# Patient Record
Sex: FEMALE | Race: WHITE | NOT HISPANIC OR LATINO
[De-identification: names, ages, dates, MRNs, and addresses within clinical notes are randomized per-mention and may not be internally consistent; named-entity substitution may affect disease eponyms.]

---

## 2024-08-30 PROBLEM — Z00.00 ENCOUNTER FOR PREVENTIVE HEALTH EXAMINATION: Status: ACTIVE | Noted: 2024-08-30

## 2024-09-02 ENCOUNTER — NON-APPOINTMENT (OUTPATIENT)
Age: 34
End: 2024-09-02

## 2024-09-03 ENCOUNTER — NON-APPOINTMENT (OUTPATIENT)
Age: 34
End: 2024-09-03

## 2024-09-03 ENCOUNTER — APPOINTMENT (OUTPATIENT)
Dept: OBGYN | Facility: CLINIC | Age: 34
End: 2024-09-03
Payer: COMMERCIAL

## 2024-09-03 DIAGNOSIS — Z34.90 ENCOUNTER FOR SUPERVISION OF NORMAL PREGNANCY, UNSPECIFIED, UNSPECIFIED TRIMESTER: ICD-10-CM

## 2024-09-03 DIAGNOSIS — Z80.0 FAMILY HISTORY OF MALIGNANT NEOPLASM OF DIGESTIVE ORGANS: ICD-10-CM

## 2024-09-03 PROCEDURE — 0500F INITIAL PRENATAL CARE VISIT: CPT

## 2024-09-04 LAB
BASOPHILS # BLD AUTO: 0.03 K/UL
BASOPHILS NFR BLD AUTO: 0.4 %
EOSINOPHIL # BLD AUTO: 0.05 K/UL
EOSINOPHIL NFR BLD AUTO: 0.6 %
HCT VFR BLD CALC: 35.7 %
HGB BLD-MCNC: 11.8 G/DL
IMM GRANULOCYTES NFR BLD AUTO: 0.7 %
LYMPHOCYTES # BLD AUTO: 1.36 K/UL
LYMPHOCYTES NFR BLD AUTO: 16.9 %
MAN DIFF?: NORMAL
MCHC RBC-ENTMCNC: 31.8 PG
MCHC RBC-ENTMCNC: 33.1 GM/DL
MCV RBC AUTO: 96.2 FL
MONOCYTES # BLD AUTO: 0.55 K/UL
MONOCYTES NFR BLD AUTO: 6.8 %
NEUTROPHILS # BLD AUTO: 6 K/UL
NEUTROPHILS NFR BLD AUTO: 74.6 %
PLATELET # BLD AUTO: 149 K/UL
RBC # BLD: 3.71 M/UL
RBC # FLD: 13.2 %
WBC # FLD AUTO: 8.05 K/UL

## 2024-09-05 LAB
ABO + RH PNL BLD: NORMAL
B19V IGG SER QL IA: 0.35 INDEX
B19V IGG+IGM SER-IMP: NEGATIVE
B19V IGG+IGM SER-IMP: NORMAL
B19V IGM FLD-ACNC: 0.35 INDEX
B19V IGM SER-ACNC: NEGATIVE
BACTERIA UR CULT: NORMAL
BLD GP AB SCN SERPL QL: NORMAL
C TRACH RRNA SPEC QL NAA+PROBE: NOT DETECTED
CMV IGG SERPL QL: <0.2 U/ML
CMV IGG SERPL-IMP: NEGATIVE
CMV IGM SERPL QL: <8 AU/ML
CMV IGM SERPL QL: NEGATIVE
ESTIMATED AVERAGE GLUCOSE: 82 MG/DL
HBA1C MFR BLD HPLC: 4.5 %
HBV SURFACE AG SER QL: NONREACTIVE
HCV AB SER QL: NONREACTIVE
HCV S/CO RATIO: 0.1 S/CO
HGB A MFR BLD: 97.2 %
HGB A2 MFR BLD: 2.8 %
HGB FRACT BLD-IMP: NORMAL
HIV1+2 AB SPEC QL IA.RAPID: NONREACTIVE
HSV 1+2 IGG SER IA-IMP: NEGATIVE
HSV 1+2 IGG SER IA-IMP: NEGATIVE
HSV1 IGG SER QL: 0.26 INDEX
HSV2 IGG SER QL: 0.06 INDEX
MEV IGG FLD QL IA: <5 AU/ML
MEV IGG+IGM SER-IMP: NEGATIVE
MEV IGM SER QL: 0.17 AU
N GONORRHOEA RRNA SPEC QL NAA+PROBE: NOT DETECTED
RUBV IGG FLD-ACNC: 8.39 INDEX
RUBV IGG SER-IMP: POSITIVE
RUBV IGM FLD-ACNC: <20 AU/ML
SOURCE AMPLIFICATION: NORMAL
T GONDII AB SER-IMP: NEGATIVE
T GONDII AB SER-IMP: NEGATIVE
T GONDII IGG SER QL: <3 IU/ML
T GONDII IGM SER QL: <3 AU/ML
T PALLIDUM AB SER QL IA: NEGATIVE
TSH SERPL-ACNC: 1.38 UIU/ML
VZV AB TITR SER: POSITIVE
VZV IGG SER IF-ACNC: 1209 INDEX
VZV IGM SER IF-ACNC: <0.91 INDEX

## 2024-09-13 ENCOUNTER — APPOINTMENT (OUTPATIENT)
Dept: ANTEPARTUM | Facility: CLINIC | Age: 34
End: 2024-09-13
Payer: COMMERCIAL

## 2024-09-13 ENCOUNTER — ASOB RESULT (OUTPATIENT)
Age: 34
End: 2024-09-13

## 2024-09-13 PROCEDURE — 76816 OB US FOLLOW-UP PER FETUS: CPT

## 2024-09-13 PROCEDURE — 76820 UMBILICAL ARTERY ECHO: CPT | Mod: 59

## 2024-09-13 PROCEDURE — 76819 FETAL BIOPHYS PROFIL W/O NST: CPT | Mod: 59

## 2024-09-18 ENCOUNTER — APPOINTMENT (OUTPATIENT)
Dept: OBGYN | Facility: CLINIC | Age: 34
End: 2024-09-18

## 2024-09-18 DIAGNOSIS — Z23 ENCOUNTER FOR IMMUNIZATION: ICD-10-CM

## 2024-09-18 PROCEDURE — 90471 IMMUNIZATION ADMIN: CPT

## 2024-09-18 PROCEDURE — 0502F SUBSEQUENT PRENATAL CARE: CPT

## 2024-09-18 PROCEDURE — 90686 IIV4 VACC NO PRSV 0.5 ML IM: CPT

## 2024-09-18 PROCEDURE — 90715 TDAP VACCINE 7 YRS/> IM: CPT

## 2024-09-18 PROCEDURE — 90472 IMMUNIZATION ADMIN EACH ADD: CPT

## 2024-09-30 ENCOUNTER — NON-APPOINTMENT (OUTPATIENT)
Age: 34
End: 2024-09-30

## 2024-09-30 ENCOUNTER — APPOINTMENT (OUTPATIENT)
Dept: OBGYN | Facility: CLINIC | Age: 34
End: 2024-09-30
Payer: COMMERCIAL

## 2024-09-30 PROCEDURE — 0500F INITIAL PRENATAL CARE VISIT: CPT

## 2024-10-02 ENCOUNTER — NON-APPOINTMENT (OUTPATIENT)
Age: 34
End: 2024-10-02

## 2024-10-14 ENCOUNTER — NON-APPOINTMENT (OUTPATIENT)
Age: 34
End: 2024-10-14

## 2024-10-14 ENCOUNTER — APPOINTMENT (OUTPATIENT)
Dept: OBGYN | Facility: CLINIC | Age: 34
End: 2024-10-14
Payer: COMMERCIAL

## 2024-10-14 DIAGNOSIS — Z23 ENCOUNTER FOR IMMUNIZATION: ICD-10-CM

## 2024-10-14 PROCEDURE — 0502F SUBSEQUENT PRENATAL CARE: CPT

## 2024-10-15 PROBLEM — Z23 ENCOUNTER FOR IMMUNIZATION: Status: ACTIVE | Noted: 2024-09-18 | Resolved: 2024-10-29

## 2024-10-16 LAB
BASOPHILS # BLD AUTO: 0.04 K/UL
BASOPHILS NFR BLD AUTO: 0.5 %
EOSINOPHIL # BLD AUTO: 0.05 K/UL
EOSINOPHIL NFR BLD AUTO: 0.7 %
HCT VFR BLD CALC: 34.1 %
HGB BLD-MCNC: 11.2 G/DL
IMM GRANULOCYTES NFR BLD AUTO: 1.2 %
LYMPHOCYTES # BLD AUTO: 1.09 K/UL
LYMPHOCYTES NFR BLD AUTO: 14.3 %
MAN DIFF?: NORMAL
MCHC RBC-ENTMCNC: 32 PG
MCHC RBC-ENTMCNC: 32.8 GM/DL
MCV RBC AUTO: 97.4 FL
MONOCYTES # BLD AUTO: 0.56 K/UL
MONOCYTES NFR BLD AUTO: 7.3 %
NEUTROPHILS # BLD AUTO: 5.79 K/UL
NEUTROPHILS NFR BLD AUTO: 76 %
PLATELET # BLD AUTO: 121 K/UL
RBC # BLD: 3.5 M/UL
RBC # FLD: 12.7 %
WBC # FLD AUTO: 7.62 K/UL

## 2024-10-17 LAB — B-HEM STREP SPEC QL CULT: NORMAL

## 2024-10-18 ENCOUNTER — ASOB RESULT (OUTPATIENT)
Age: 34
End: 2024-10-18

## 2024-10-18 ENCOUNTER — APPOINTMENT (OUTPATIENT)
Dept: ANTEPARTUM | Facility: CLINIC | Age: 34
End: 2024-10-18
Payer: COMMERCIAL

## 2024-10-18 PROCEDURE — 76819 FETAL BIOPHYS PROFIL W/O NST: CPT | Mod: 59

## 2024-10-18 PROCEDURE — 76816 OB US FOLLOW-UP PER FETUS: CPT

## 2024-10-18 PROCEDURE — 76820 UMBILICAL ARTERY ECHO: CPT | Mod: 59

## 2024-10-24 ENCOUNTER — APPOINTMENT (OUTPATIENT)
Dept: OBGYN | Facility: CLINIC | Age: 34
End: 2024-10-24
Payer: COMMERCIAL

## 2024-10-24 LAB
BILEACIDS T: 29 UMOL/L
CHENDEOXYCHOLIC ACID: 8.6 UMOL/L
CHOLIC ACID: 15 UMOL/L
DEOXYCHOLIC ACID: 4.9 UMOL/L
URSODEOXYCH: <0.1 UMOL/L

## 2024-10-24 PROCEDURE — 0502F SUBSEQUENT PRENATAL CARE: CPT

## 2024-10-25 ENCOUNTER — INPATIENT (INPATIENT)
Facility: HOSPITAL | Age: 34
LOS: 2 days | Discharge: HOME | End: 2024-10-28
Attending: OBSTETRICS & GYNECOLOGY | Admitting: OBSTETRICS & GYNECOLOGY
Payer: COMMERCIAL

## 2024-10-25 VITALS — TEMPERATURE: 98 F

## 2024-10-25 LAB
ALBUMIN SERPL ELPH-MCNC: 3.5 G/DL — SIGNIFICANT CHANGE UP (ref 3.3–5)
ALP SERPL-CCNC: 206 U/L — HIGH (ref 40–120)
ALT FLD-CCNC: 43 U/L — SIGNIFICANT CHANGE UP (ref 10–45)
ANION GAP SERPL CALC-SCNC: 12 MMOL/L — SIGNIFICANT CHANGE UP (ref 5–17)
AST SERPL-CCNC: 41 U/L — HIGH (ref 10–40)
BASOPHILS # BLD AUTO: 0.04 K/UL — SIGNIFICANT CHANGE UP (ref 0–0.2)
BASOPHILS NFR BLD AUTO: 0.6 % — SIGNIFICANT CHANGE UP (ref 0–2)
BILIRUB SERPL-MCNC: 0.9 MG/DL — SIGNIFICANT CHANGE UP (ref 0.2–1.2)
BLD GP AB SCN SERPL QL: NEGATIVE — SIGNIFICANT CHANGE UP
BUN SERPL-MCNC: 12 MG/DL — SIGNIFICANT CHANGE UP (ref 7–23)
CALCIUM SERPL-MCNC: 9 MG/DL — SIGNIFICANT CHANGE UP (ref 8.4–10.5)
CHLORIDE SERPL-SCNC: 106 MMOL/L — SIGNIFICANT CHANGE UP (ref 96–108)
CO2 SERPL-SCNC: 20 MMOL/L — LOW (ref 22–31)
CREAT ?TM UR-MCNC: 62 MG/DL — SIGNIFICANT CHANGE UP
CREAT SERPL-MCNC: 0.66 MG/DL — SIGNIFICANT CHANGE UP (ref 0.5–1.3)
EGFR: 118 ML/MIN/1.73M2 — SIGNIFICANT CHANGE UP
EOSINOPHIL # BLD AUTO: 0.04 K/UL — SIGNIFICANT CHANGE UP (ref 0–0.5)
EOSINOPHIL NFR BLD AUTO: 0.6 % — SIGNIFICANT CHANGE UP (ref 0–6)
FIBRINOGEN PPP-MCNC: 536 MG/DL — HIGH (ref 200–445)
GLUCOSE SERPL-MCNC: 91 MG/DL — SIGNIFICANT CHANGE UP (ref 70–99)
HCT VFR BLD CALC: 35.4 % — SIGNIFICANT CHANGE UP (ref 34.5–45)
HGB BLD-MCNC: 11.7 G/DL — SIGNIFICANT CHANGE UP (ref 11.5–15.5)
IMM GRANULOCYTES NFR BLD AUTO: 1.3 % — HIGH (ref 0–0.9)
LDH SERPL L TO P-CCNC: 220 U/L — SIGNIFICANT CHANGE UP (ref 50–242)
LYMPHOCYTES # BLD AUTO: 1.52 K/UL — SIGNIFICANT CHANGE UP (ref 1–3.3)
LYMPHOCYTES # BLD AUTO: 21.4 % — SIGNIFICANT CHANGE UP (ref 13–44)
MCHC RBC-ENTMCNC: 30.9 PG — SIGNIFICANT CHANGE UP (ref 27–34)
MCHC RBC-ENTMCNC: 33.1 GM/DL — SIGNIFICANT CHANGE UP (ref 32–36)
MCV RBC AUTO: 93.4 FL — SIGNIFICANT CHANGE UP (ref 80–100)
MONOCYTES # BLD AUTO: 0.55 K/UL — SIGNIFICANT CHANGE UP (ref 0–0.9)
MONOCYTES NFR BLD AUTO: 7.8 % — SIGNIFICANT CHANGE UP (ref 2–14)
NEUTROPHILS # BLD AUTO: 4.85 K/UL — SIGNIFICANT CHANGE UP (ref 1.8–7.4)
NEUTROPHILS NFR BLD AUTO: 68.3 % — SIGNIFICANT CHANGE UP (ref 43–77)
NRBC # BLD: 0 /100 WBCS — SIGNIFICANT CHANGE UP (ref 0–0)
PLATELET # BLD AUTO: 135 K/UL — LOW (ref 150–400)
POTASSIUM SERPL-MCNC: 3.8 MMOL/L — SIGNIFICANT CHANGE UP (ref 3.5–5.3)
POTASSIUM SERPL-SCNC: 3.8 MMOL/L — SIGNIFICANT CHANGE UP (ref 3.5–5.3)
PROT ?TM UR-MCNC: 9 MG/DL — SIGNIFICANT CHANGE UP (ref 0–12)
PROT SERPL-MCNC: 7.1 G/DL — SIGNIFICANT CHANGE UP (ref 6–8.3)
PROT/CREAT UR-RTO: 0.1 RATIO — SIGNIFICANT CHANGE UP (ref 0–0.2)
RBC # BLD: 3.79 M/UL — LOW (ref 3.8–5.2)
RBC # FLD: 12.5 % — SIGNIFICANT CHANGE UP (ref 10.3–14.5)
RH IG SCN BLD-IMP: POSITIVE — SIGNIFICANT CHANGE UP
RH IG SCN BLD-IMP: POSITIVE — SIGNIFICANT CHANGE UP
SODIUM SERPL-SCNC: 138 MMOL/L — SIGNIFICANT CHANGE UP (ref 135–145)
URATE SERPL-MCNC: 6.6 MG/DL — SIGNIFICANT CHANGE UP (ref 2.5–7)
WBC # BLD: 7.09 K/UL — SIGNIFICANT CHANGE UP (ref 3.8–10.5)
WBC # FLD AUTO: 7.09 K/UL — SIGNIFICANT CHANGE UP (ref 3.8–10.5)

## 2024-10-25 RX ORDER — FAMOTIDINE 10 MG/ML
20 INJECTION INTRAVENOUS ONCE
Refills: 0 | Status: DISCONTINUED | OUTPATIENT
Start: 2024-10-25 | End: 2024-10-25

## 2024-10-25 RX ORDER — OXYTOCIN IN D5W-0.2% SODIUM CL 15/250 ML
167 PLASTIC BAG, INJECTION (ML) INTRAVENOUS
Qty: 30 | Refills: 0 | Status: DISCONTINUED | OUTPATIENT
Start: 2024-10-25 | End: 2024-10-26

## 2024-10-25 RX ORDER — CHLORHEXIDINE GLUCONATE 40 MG/ML
1 SOLUTION TOPICAL DAILY
Refills: 0 | Status: DISCONTINUED | OUTPATIENT
Start: 2024-10-25 | End: 2024-10-28

## 2024-10-25 RX ORDER — CITRIC ACID/SODIUM CITRATE 334-500MG
30 SOLUTION, ORAL ORAL ONCE
Refills: 0 | Status: DISCONTINUED | OUTPATIENT
Start: 2024-10-25 | End: 2024-10-25

## 2024-10-25 RX ORDER — OXYTOCIN IN D5W-0.2% SODIUM CL 15/250 ML
PLASTIC BAG, INJECTION (ML) INTRAVENOUS
Qty: 30 | Refills: 0 | Status: DISCONTINUED | OUTPATIENT
Start: 2024-10-25 | End: 2024-10-26

## 2024-10-25 RX ORDER — CITRIC ACID/SODIUM CITRATE 334-500MG
15 SOLUTION, ORAL ORAL EVERY 6 HOURS
Refills: 0 | Status: DISCONTINUED | OUTPATIENT
Start: 2024-10-25 | End: 2024-10-26

## 2024-10-25 RX ORDER — CEFAZOLIN SODIUM 1 G
2000 VIAL (EA) INJECTION ONCE
Refills: 0 | Status: DISCONTINUED | OUTPATIENT
Start: 2024-10-25 | End: 2024-10-25

## 2024-10-25 RX ORDER — CHLORHEXIDINE GLUCONATE 40 MG/ML
1 SOLUTION TOPICAL DAILY
Refills: 0 | Status: DISCONTINUED | OUTPATIENT
Start: 2024-10-25 | End: 2024-10-26

## 2024-10-25 RX ADMIN — Medication 125 MILLILITER(S): at 07:21

## 2024-10-25 RX ADMIN — Medication 2 MILLIUNIT(S)/MIN: at 09:21

## 2024-10-25 RX ADMIN — Medication 125 MILLILITER(S): at 21:55

## 2024-10-25 NOTE — OB PROVIDER H&P - ASSESSMENT
A/P: 34y  at 38w1d presenting for IOL for ICP. BPs normotensive.   - Admit to L&D  - NPO and IVF  - Routine labs. Prenatal labs reviewed, as above.   - Full labs  - GBS negative, no abx indicated  - Fetal status reassuring, continuous efm and toco.   - Plan for IOL with cook balloon and pitocin    D/w Dr. Alcala

## 2024-10-25 NOTE — OB RN PATIENT PROFILE - BREASTFEEDING PROVIDES STABLE TEMPERATURE THROUGH SKIN TO SKIN CONTACT

## 2024-10-25 NOTE — OB RN PATIENT PROFILE - FUNCTIONAL ASSESSMENT - BASIC MOBILITY 3.
Please find out if patient is burning with urination or if it is like sitting on a hot golf ball type burning   4 = No assist / stand by assistance

## 2024-10-25 NOTE — OB RN PATIENT PROFILE - NSWEIGHTCALCTOOLDRUG_GEN_A_CORE
Wound care instructions provided.  Observe for any signs of infection or other problems.  Return for suture removal in 10 days.  
 used

## 2024-10-25 NOTE — OB PROVIDER IHI INDUCTION/AUGMENTATION NOTE - LABOR: CERVICAL DILATION
Progress Note( Dr. Sanchez)  2024  Subjective:   Admit Date: 2024  PCP: No primary care provider on file.    Admitted For :  Delivering the baby had  on 2024     Consulted For: Better control of blood glucose     Interval History: Patient is doing a lot better this morning did not require any insulin yesterday blood glucose has been in the 170+ some time    Patient is anemic the most recent hemoglobin hemoglobin was 9.6    Denies any chest pains,   Denies SOB .   Denies nausea or vomiting.   No new bowel or bladder symptoms.     No intake or output data in the 24 hours ending 24 2131    DATA    CBC:   Recent Labs     24  0600   WBC 9.4   HGB 9.6*         CMP:No results for input(s): \"NA\", \"K\", \"CL\", \"CO2\", \"BUN\", \"CREATININE\", \"GLU\", \"CALCIUM\", \"PROT\", \"LABALBU\", \"BILITOT\", \"ALKPHOS\", \"AST\", \"ALT\" in the last 72 hours.  Lipids: No results found for: \"CHOL\", \"HDL\", \"TRIG\"  Glucose:  Recent Labs     24  2236 24  0845 24  1248   POCGLU 155* 103* 90       MazarsomjgH1W:No results found for: \"LABA1C\"  High Sensitivity TSH: No results found for: \"TSHHS\"  Free T3: No results found for: \"FT3\"  Free T4:No results found for: \"T4FREE\"    No orders to display        Scheduled Medicines   Medications:        Infusions:           Objective:   Vitals: /86   Pulse 86   Temp 98.1 °F (36.7 °C) (Oral)   Resp 18   Ht 1.59 m (5' 2.6\")   Wt 102.1 kg (225 lb)   LMP 2023 (Exact Date)   SpO2 99%   Breastfeeding Unknown   BMI 40.37 kg/m²   General appearance: alert and cooperative with exam  Neck: no JVD or bruit  Thyroid : Normal lobes   Lungs: Has Vesicular Breath sounds   Heart:  regular rate and rhythm  Abdomen: soft, non-tender; bowel sounds normal; no masses,  no organomegaly  Musculoskeletal: Normal  Extremities: extremities normal, , no edema  Neurologic:  Awake, alert, oriented to name, place and time.  Cranial nerves II-XII are grossly intact.  Motor  1-1.9 cm

## 2024-10-25 NOTE — OB RN PATIENT PROFILE - FUNCTIONAL ASSESSMENT - BASIC MOBILITY 1.
[Joint Pain] : joint pain [Joint Stiffness] : joint stiffness [Muscle Weakness] : muscle weakness [Back Pain] : back pain [Negative] : Gastrointestinal 4 = No assist / stand by assistance

## 2024-10-25 NOTE — OB RN PATIENT PROFILE - FALL HARM RISK - UNIVERSAL INTERVENTIONS
Bed in lowest position, wheels locked, appropriate side rails in place/Call bell, personal items and telephone in reach/Instruct patient to call for assistance before getting out of bed or chair/Non-slip footwear when patient is out of bed/[place] to call system/Physically safe environment - no spills, clutter or unnecessary equipment/Purposeful Proactive Rounding/Room/bathroom lighting operational, light cord in reach Quality 131: Pain Assessment And Follow-Up: Pain assessment using a standardized tool is documented as negative, no follow-up plan required Quality 110: Preventive Care And Screening: Influenza Immunization: Influenza Immunization Ordered or Recommended, but not Administered due to system reason Detail Level: Zone

## 2024-10-25 NOTE — OB PROVIDER H&P - HISTORY OF PRESENT ILLNESS
FLORI [id number]  S: 34y  at 38w1d presenting for IOL for ICP. Pt reports itching began 2 weeks ago, with BA 29 on 10/23 Pt reports +FM and mild cramping, denies contractions, LOF, ROM, and vaginal bleeding. She denies HA, blurry vision, scotoma, RUQ/epigastric pain.    Antepartum  Spontaneous, NIPT wnl, anatomy scan wnl, passed GCT, GBS negative, EFW 3400g  ICP: BA 10/23, itching 2 weeks ago    Ob: : current  GYN: denies hx of abnormal paps, ovarian cysts, uterine fibroids, STIs  PMHx: recurrent UTIs  Surg: denies  Meds: PNV  No Known Allergies      PE  T(C): 36.9 (10-25-24 @ 07:03), Max: 36.9 (10-25-24 @ 06:40)  HR: 67 (10-25-24 @ 07:03) (67 - 67)  BP: 130/84 (10-25-24 @ 07:03) (130/84 - 130/84)  RR: 15 (10-25-24 @ 07:03) (15 - 15)  SpO2: 100% (10-25-24 @ 07:03) (100% - 100%)  General: NAD  Abdomen: Soft; Nontender; Gravid  SVE: /-3    NST: baseline , mod variability, +accels, -decels, cat I  [place]: contractions q5-6min  TAUS: cephalic presentation

## 2024-10-26 LAB — T PALLIDUM AB TITR SER: NEGATIVE — SIGNIFICANT CHANGE UP

## 2024-10-26 PROCEDURE — 88307 TISSUE EXAM BY PATHOLOGIST: CPT | Mod: 26

## 2024-10-26 PROCEDURE — 59400 OBSTETRICAL CARE: CPT

## 2024-10-26 RX ORDER — OXYCODONE HYDROCHLORIDE 30 MG/1
5 TABLET ORAL
Refills: 0 | Status: DISCONTINUED | OUTPATIENT
Start: 2024-10-26 | End: 2024-10-28

## 2024-10-26 RX ORDER — DIBUCAINE 1 %
1 OINTMENT (GRAM) TOPICAL EVERY 6 HOURS
Refills: 0 | Status: DISCONTINUED | OUTPATIENT
Start: 2024-10-26 | End: 2024-10-28

## 2024-10-26 RX ORDER — HYDROCORTISONE 1 %
1 OINTMENT (GRAM) TOPICAL EVERY 6 HOURS
Refills: 0 | Status: DISCONTINUED | OUTPATIENT
Start: 2024-10-26 | End: 2024-10-28

## 2024-10-26 RX ORDER — IBUPROFEN 200 MG
600 TABLET ORAL EVERY 6 HOURS
Refills: 0 | Status: COMPLETED | OUTPATIENT
Start: 2024-10-26 | End: 2025-09-24

## 2024-10-26 RX ORDER — MAGNESIUM HYDROXIDE 1200 MG/15ML
30 SUSPENSION ORAL
Refills: 0 | Status: DISCONTINUED | OUTPATIENT
Start: 2024-10-26 | End: 2024-10-28

## 2024-10-26 RX ORDER — IBUPROFEN 200 MG
600 TABLET ORAL EVERY 6 HOURS
Refills: 0 | Status: DISCONTINUED | OUTPATIENT
Start: 2024-10-26 | End: 2024-10-28

## 2024-10-26 RX ORDER — SIMETHICONE 80 MG/1
80 TABLET, CHEWABLE ORAL EVERY 4 HOURS
Refills: 0 | Status: DISCONTINUED | OUTPATIENT
Start: 2024-10-26 | End: 2024-10-28

## 2024-10-26 RX ORDER — FENTANYL/BUPIVACAINE/NS/PF 2-1250MCG
250 SYRINGE (ML) EPIDURAL
Refills: 0 | Status: DISCONTINUED | OUTPATIENT
Start: 2024-10-26 | End: 2024-10-26

## 2024-10-26 RX ORDER — BENZOCAINE 200 MG/G
1 GEL ORAL EVERY 6 HOURS
Refills: 0 | Status: DISCONTINUED | OUTPATIENT
Start: 2024-10-26 | End: 2024-10-28

## 2024-10-26 RX ORDER — OXYTOCIN IN D5W-0.2% SODIUM CL 15/250 ML
167 PLASTIC BAG, INJECTION (ML) INTRAVENOUS
Qty: 30 | Refills: 0 | Status: DISCONTINUED | OUTPATIENT
Start: 2024-10-26 | End: 2024-10-28

## 2024-10-26 RX ORDER — DIPHENHYDRAMINE HCL 12.5MG/5ML
25 ELIXIR ORAL EVERY 6 HOURS
Refills: 0 | Status: DISCONTINUED | OUTPATIENT
Start: 2024-10-26 | End: 2024-10-28

## 2024-10-26 RX ORDER — SODIUM CHLORIDE 9 MG/ML
3 INJECTION, SOLUTION INTRAMUSCULAR; INTRAVENOUS; SUBCUTANEOUS EVERY 8 HOURS
Refills: 0 | Status: DISCONTINUED | OUTPATIENT
Start: 2024-10-26 | End: 2024-10-28

## 2024-10-26 RX ORDER — PRENATAL VIT/IRON FUM/FOLIC AC 60 MG-1 MG
1 TABLET ORAL DAILY
Refills: 0 | Status: DISCONTINUED | OUTPATIENT
Start: 2024-10-26 | End: 2024-10-28

## 2024-10-26 RX ORDER — PRAMOXINE HCL 1 %
1 CREAM (GRAM) RECTAL EVERY 4 HOURS
Refills: 0 | Status: DISCONTINUED | OUTPATIENT
Start: 2024-10-26 | End: 2024-10-28

## 2024-10-26 RX ORDER — ACETAMINOPHEN 500 MG
975 TABLET ORAL
Refills: 0 | Status: DISCONTINUED | OUTPATIENT
Start: 2024-10-26 | End: 2024-10-28

## 2024-10-26 RX ORDER — CLOSTRIDIUM TETANI TOXOID ANTIGEN (FORMALDEHYDE INACTIVATED), CORYNEBACTERIUM DIPHTHERIAE TOXOID ANTIGEN (FORMALDEHYDE INACTIVATED), BORDETELLA PERTUSSIS TOXOID ANTIGEN (GLUTARALDEHYDE INACTIVATED), BORDETELLA PERTUSSIS FILAMENTOUS HEMAGGLUTININ ANTIGEN (FORMALDEHYDE INACTIVATED), BORDETELLA PERTUSSIS PERTACTIN ANTIGEN, AND BORDETELLA PERTUSSIS FIMBRIAE 2/3 ANTIGEN 5; 2; 2.5; 5; 3; 5 [LF]/.5ML; [LF]/.5ML; UG/.5ML; UG/.5ML; UG/.5ML; UG/.5ML
0.5 INJECTION, SUSPENSION INTRAMUSCULAR ONCE
Refills: 0 | Status: DISCONTINUED | OUTPATIENT
Start: 2024-10-26 | End: 2024-10-28

## 2024-10-26 RX ORDER — OXYCODONE HYDROCHLORIDE 30 MG/1
5 TABLET ORAL ONCE
Refills: 0 | Status: DISCONTINUED | OUTPATIENT
Start: 2024-10-26 | End: 2024-10-28

## 2024-10-26 RX ORDER — KETOROLAC TROMETHAMINE 30 MG/ML
30 INJECTION INTRAMUSCULAR; INTRAVENOUS ONCE
Refills: 0 | Status: DISCONTINUED | OUTPATIENT
Start: 2024-10-26 | End: 2024-10-28

## 2024-10-26 RX ORDER — MODIFIED LANOLIN
1 OINTMENT (GRAM) TOPICAL EVERY 6 HOURS
Refills: 0 | Status: DISCONTINUED | OUTPATIENT
Start: 2024-10-26 | End: 2024-10-28

## 2024-10-26 RX ADMIN — Medication 125 MILLILITER(S): at 02:42

## 2024-10-26 RX ADMIN — Medication 167 MILLIUNIT(S)/MIN: at 09:36

## 2024-10-26 RX ADMIN — SODIUM CHLORIDE 3 MILLILITER(S): 9 INJECTION, SOLUTION INTRAMUSCULAR; INTRAVENOUS; SUBCUTANEOUS at 22:10

## 2024-10-26 RX ADMIN — Medication 975 MILLIGRAM(S): at 20:32

## 2024-10-26 RX ADMIN — SODIUM CHLORIDE 3 MILLILITER(S): 9 INJECTION, SOLUTION INTRAMUSCULAR; INTRAVENOUS; SUBCUTANEOUS at 15:06

## 2024-10-26 NOTE — OB PROVIDER DELIVERY SUMMARY - NSSELHIDDEN_OBGYN_ALL_OB_FT
[NS_DeliveryAttending1_OBGYN_ALL_OB_FT:[id number]],[NS_DeliveryAssist1_OBGYN_ALL_OB_FT:[id number]=],[NS_DeliveryRN_OBGYN_ALL_OB_FT:[id number]=],[NS_CirculateRN2_OBGYN_ALL_OB_FT:[id number]=]

## 2024-10-26 NOTE — OB RN DELIVERY SUMMARY - NSSELHIDDEN_OBGYN_ALL_OB_FT
[NS_DeliveryAttending1_OBGYN_ALL_OB_FT:[id number]],[NS_DeliveryAssist1_OBGYN_ALL_OB_FT:[id number]=],[NS_DeliveryRN_OBGYN_ALL_OB_FT:[id number]=],[NS_CirculateRN2_OBGYN_ALL_OB_FT:[id number]=]

## 2024-10-26 NOTE — PRE-ANESTHESIA EVALUATION ADULT - NSANTHOSAYNRD_GEN_A_CORE
No. PEÑA screening performed.  STOP BANG Legend: 0-2 = LOW Risk; 3-4 = INTERMEDIATE Risk; 5-8 = HIGH Risk

## 2024-10-26 NOTE — OB PROVIDER DELIVERY SUMMARY - NSPROVIDERDELIVERYNOTE_OBGYN_ALL_OB_FT
35 yo  at 38w1d presented for IOL for ICP.  Received pitocin and balloon for induction, epidural for analgesia, AROM clear fluid.  Progressed to fully dilated and delivered live infant over intact perineum in DAIN position with R compound presentation. Placenta delivered without difficulty. 2nd degree lac repaired with 2-0 vicryl and vicryl rapide. . Pt in stable condition. 33 yo  at 38w1d presented for IOL for ICP.  Received pitocin and balloon for induction, epidural for analgesia, AROM clear fluid.  Progressed to fully dilated and delivered live infant girl over intact perineum in DAIN position with R compound presentation. apgars 9/9. 3vc Placenta delivered without difficulty. 2nd degree lac repaired with 2-0 vicryl and vicryl rapide. EBL 300cc. Pt in stable condition. rectal exam wnl, vaginal sweep done and negative. counts correct x 2.

## 2024-10-26 NOTE — PRE-ANESTHESIA EVALUATION ADULT - NSANTHPMHFT_GEN_ALL_CORE
34y  at 38w1d presenting for IOL for ICP. Pt reports itching began 2 weeks ago, with BA 29 on 10/23 Pt reports +FM and mild cramping, denies contractions, LOF, ROM, and vaginal bleeding. She denies HA, blurry vision, scotoma, RUQ/epigastric pain.    Antepartum  Spontaneous, NIPT wnl, anatomy scan wnl, passed GCT, GBS negative, EFW 3400g  ICP: BA 10/23, itching 2 weeks ago    Ob: : current  GYN: denies hx of abnormal paps, ovarian cysts, uterine fibroids, STIs  PMHx: recurrent UTIs  Surg: denies  Meds: PNV  No Known Allergies

## 2024-10-26 NOTE — OB PROVIDER LABOR PROGRESS NOTE - ASSESSMENT
Cat 1 tracing. [place] adjusted. Cervix edematous, anterior lip. Put in throne position and continue to monitor. 
Continue to monitor  Tracing Cat I   Plan to uptitrate pitocin as tolerated
- Continue current management
Continue to monitor  Adjust monitors to maintain continuous tracing  Tracing Category 1 at this time
Continue to monitor  Tracing Cat I
Pitocin at 10mu. Continue current managment.
Pitocin at 12mu. Continue current management.
Cat 1 tracing. Patient comfortable on 16mu pitocin. AROM performed for copious clear fluid. Continue to monitor. 
Cat 1 tracing. Cervix feeling edematous. Patient to get top off and continue pit at 20mu.

## 2024-10-26 NOTE — OB PROVIDER LABOR PROGRESS NOTE - NS_OBIHIFHRDETAILS_OBGYN_ALL_OB_FT
120 bpm, mod variability, + accels, - decels
baseline , mod variability, +accels, -decels, cat I
baseline 120, moderate variability, +accels, -decels; Cat I tracing
baseline , mod variability, +accels, -decels, cat I
baseline , mod variability, +accels, -decels, cat I
baseline 120, moderate variability, +accels, -decels; Cat I tracing  some loss of contact
baseline 125, moderate variability, +accels, -decels; Cat I tracing
FHT Category I with moderate variability and accelerations, no decelerations.
130 bpm, mod variability, + accels, - decels
130 bpm, mod variability, + accels, - decels

## 2024-10-26 NOTE — OB PROVIDER LABOR PROGRESS NOTE - NS_OBIHICONTRACTIONPATTERNDETAILS_OBGYN_ALL_OB_FT
CTX q 2 min
Irregular contractions
contractions q2-3min
contractions q2-4min
contractions q5-6min
ctx [id number]
CTX q 2 min
difficult to determine
ctx q2-3minutes
ctx q2-4 minutes

## 2024-10-26 NOTE — OB RN DELIVERY SUMMARY - NS_SEPSISRSKCALC_OBGYN_ALL_OB_FT
EOS calculated successfully. EOS Risk Factor: 0.5/1000 live births ([facility] national incidence); GA=38w2d; Temp=98.5; ROM=10.283; GBS='Negative'; Antibiotics='No antibiotics or any antibiotics < 2 hrs prior to birth'

## 2024-10-26 NOTE — OB PROVIDER LABOR PROGRESS NOTE - NS_SUBJECTIVE/OBJECTIVE_OBGYN_ALL_OB_FT
130 mod rizwan +accel -decel  ctx q2 min     Cat I reassuring fetal pH status  pit on 20, continue as tolerated
EFM reviewed. Baseline 120, moderate variability, +accelerations, no decelerations. Category I tracing.   Ctx q2-3min.     Pitocin at 8, will continue to increase as tolerated.   VS reviewed. BPs 129/79  Balloon in place, will check position shortly.   Dr. Alcala in house.
Night team assuming care  EFM reviewed  Pit on at 14mu  Balloon in situ
VE 9.5/80/+1   135 mod rizwan -accel -decel segments of loss of contact due to patient sitting upright   ctx difficult to catch due to toco placement    Cat I overall reassuring fetal status  Expecting for vaginal delivery
SVE 1/80/-3. Gama balloon inserted, inflated with 80cc of sterile water and taped to tension. Pt tolerated well.
Cervical rosado balloon in place.  Pitocin at 2mu.  Comfortable without epidural.
Epidural now in situ  Pt comfortable  SVE: 5/70/-2
Patient seen at the bedside for removal of Cook balloon  Balloon now out  SVE: 5/50/-3, head ballotable  Pit on at 14mu  EFM reviewed
Pt examined, SVE 2cm around rosado balloon.
Pt examined, SVE 3cm around rosado balloon.

## 2024-10-27 RX ADMIN — Medication 600 MILLIGRAM(S): at 06:47

## 2024-10-27 RX ADMIN — SODIUM CHLORIDE 3 MILLILITER(S): 9 INJECTION, SOLUTION INTRAMUSCULAR; INTRAVENOUS; SUBCUTANEOUS at 06:10

## 2024-10-27 RX ADMIN — SODIUM CHLORIDE 3 MILLILITER(S): 9 INJECTION, SOLUTION INTRAMUSCULAR; INTRAVENOUS; SUBCUTANEOUS at 22:00

## 2024-10-27 RX ADMIN — SODIUM CHLORIDE 3 MILLILITER(S): 9 INJECTION, SOLUTION INTRAMUSCULAR; INTRAVENOUS; SUBCUTANEOUS at 14:23

## 2024-10-27 RX ADMIN — Medication 975 MILLIGRAM(S): at 20:37

## 2024-10-27 RX ADMIN — Medication 1 APPLICATION(S): at 15:17

## 2024-10-27 RX ADMIN — Medication 600 MILLIGRAM(S): at 15:16

## 2024-10-27 RX ADMIN — Medication 1 APPLICATION(S): at 15:18

## 2024-10-27 RX ADMIN — Medication 600 MILLIGRAM(S): at 00:08

## 2024-10-27 RX ADMIN — Medication 975 MILLIGRAM(S): at 11:30

## 2024-10-27 RX ADMIN — BENZOCAINE 1 SPRAY(S): 200 GEL ORAL at 15:22

## 2024-10-27 NOTE — PROGRESS NOTE ADULT - SUBJECTIVE AND OBJECTIVE BOX
Patient evaluated at bedside this morning, resting comfortable in bed, no acute events overnight. Vital signs stable overnight.  She reports pain is well controlled with tylenol and motrin.  She denies chest pain, shortness of breath, nausea, vomiting, heavy vaginal bleeding. She has been ambulating without assistance, voiding spontaneously.  Tolerating food well, without nausea/vomit.      Physical Exam:  T(C): 36.6 (10-27-24 @ 06:09), Max: 36.6 (10-27-24 @ 02:10)  HR: 61 (10-27-24 @ 06:09) (60 - 68)  BP: 97/59 (10-27-24 @ 06:09) (97/59 - 102/60)  RR: 17 (10-27-24 @ 06:09) (17 - 17)  SpO2: 96% (10-27-24 @ 06:09) (95% - 96%)    GA: NAD, A&O x 3  Pulm: no increased work of breathing  Abd: soft, nontender, nondistended, no rebound or guarding, uterus firm.  Extremities: no swelling or calf tenderness            acetaminophen     Tablet .. 975 milliGRAM(s) Oral <User Schedule>  benzocaine 20%/menthol 0.5% Spray 1 Spray(s) Topical every 6 hours PRN  chlorhexidine 2% Cloths 1 Application(s) Topical daily  dibucaine 1% Ointment 1 Application(s) Topical every 6 hours PRN  diphenhydrAMINE 25 milliGRAM(s) Oral every 6 hours PRN  diphtheria/tetanus/pertussis (acellular) Vaccine (Adacel) 0.5 milliLiter(s) IntraMuscular once  fentanyl (2 MICROgram(s)/mL) + bupivacaine 0.0625%  in 0.9% Sodium Chloride PCEA 250 milliLiter(s) Epidural PCA Continuous  hydrocortisone 1% Cream 1 Application(s) Topical every 6 hours PRN  ibuprofen  Tablet. 600 milliGRAM(s) Oral every 6 hours  ketorolac   Injectable 30 milliGRAM(s) IV Push once  lanolin Ointment 1 Application(s) Topical every 6 hours PRN  magnesium hydroxide Suspension 30 milliLiter(s) Oral two times a day PRN  oxyCODONE    IR 5 milliGRAM(s) Oral every 3 hours PRN  oxyCODONE    IR 5 milliGRAM(s) Oral once PRN  oxytocin Infusion 167 milliUNIT(s)/Min IV Continuous <Continuous>  pramoxine 1%/zinc 5% Cream 1 Application(s) Topical every 4 hours PRN  prenatal multivitamin 1 Tablet(s) Oral daily  simethicone 80 milliGRAM(s) Chew every 4 hours PRN  sodium chloride 0.9% lock flush 3 milliLiter(s) IV Push every 8 hours  witch hazel Pads 1 Application(s) Topical every 4 hours PRN     T(C): 36.6 (10-27-24 @ 06:09), Max: 36.6 (10-27-24 @ 02:10)  HR: 61 (10-27-24 @ 06:09) (60 - 68)  BP: 97/59 (10-27-24 @ 06:09) (97/59 - 102/60)  RR: 17 (10-27-24 @ 06:09) (17 - 17)  SpO2: 96% (10-27-24 @ 06:09) (95% - 96%)     ==  A/P   Pt s/p , PPD#1 , stable, meeting postpartum milestones   #gHTN  - VSS  - VSq4  - no antihypertensives  #PPC  - Pain: well controlled on tylenol/motrin  - GI: Tolerating regular diet  - : urinating without difficulty/pain  - DVT prophylaxis: ambulating frequently  - Dispo: PPD 2, unless otherwise specified

## 2024-10-27 NOTE — LACTATION INITIAL EVALUATION - NS LACT CON REASON FOR REQ
38.2 wk gestation baby, now 1 day old at this time. Placed the baby STS with the mother while I provided breastfeeding education and explained normal  behaviour and the milk production feedback system. Assisted with positioning in a football hold and taught latch strategies. Baby was able to latch deeply and is feeding well, rhythmically sucking between short pauses of rest. Mother to continue with STS when possible, room-in, and feed as per cues at least 8-12x/ day. To f/u as needed./primaparous mom/staff request/patient request

## 2024-10-28 ENCOUNTER — TRANSCRIPTION ENCOUNTER (OUTPATIENT)
Age: 34
End: 2024-10-28

## 2024-10-28 ENCOUNTER — NON-APPOINTMENT (OUTPATIENT)
Age: 34
End: 2024-10-28

## 2024-10-28 VITALS
DIASTOLIC BLOOD PRESSURE: 79 MMHG | OXYGEN SATURATION: 96 % | RESPIRATION RATE: 16 BRPM | HEART RATE: 77 BPM | SYSTOLIC BLOOD PRESSURE: 123 MMHG | TEMPERATURE: 98 F

## 2024-10-28 PROCEDURE — 86900 BLOOD TYPING SEROLOGIC ABO: CPT

## 2024-10-28 PROCEDURE — 83615 LACTATE (LD) (LDH) ENZYME: CPT

## 2024-10-28 PROCEDURE — 86850 RBC ANTIBODY SCREEN: CPT

## 2024-10-28 PROCEDURE — 86780 TREPONEMA PALLIDUM: CPT

## 2024-10-28 PROCEDURE — 80053 COMPREHEN METABOLIC PANEL: CPT

## 2024-10-28 PROCEDURE — 84550 ASSAY OF BLOOD/URIC ACID: CPT

## 2024-10-28 PROCEDURE — 85025 COMPLETE CBC W/AUTO DIFF WBC: CPT

## 2024-10-28 PROCEDURE — 82570 ASSAY OF URINE CREATININE: CPT

## 2024-10-28 PROCEDURE — 88307 TISSUE EXAM BY PATHOLOGIST: CPT

## 2024-10-28 PROCEDURE — 59050 FETAL MONITOR W/REPORT: CPT

## 2024-10-28 PROCEDURE — 84156 ASSAY OF PROTEIN URINE: CPT

## 2024-10-28 PROCEDURE — 86901 BLOOD TYPING SEROLOGIC RH(D): CPT

## 2024-10-28 PROCEDURE — 85384 FIBRINOGEN ACTIVITY: CPT

## 2024-10-28 PROCEDURE — 36415 COLL VENOUS BLD VENIPUNCTURE: CPT

## 2024-10-28 RX ORDER — BENZOCAINE 200 MG/G
1 GEL ORAL
Qty: 0 | Refills: 0 | DISCHARGE
Start: 2024-10-28

## 2024-10-28 RX ORDER — IBUPROFEN 200 MG
1 TABLET ORAL
Qty: 0 | Refills: 0 | DISCHARGE
Start: 2024-10-28

## 2024-10-28 RX ORDER — ACETAMINOPHEN 500 MG
3 TABLET ORAL
Qty: 0 | Refills: 0 | DISCHARGE
Start: 2024-10-28

## 2024-10-28 RX ADMIN — Medication 600 MILLIGRAM(S): at 12:35

## 2024-10-28 RX ADMIN — Medication 600 MILLIGRAM(S): at 06:09

## 2024-10-28 RX ADMIN — SODIUM CHLORIDE 3 MILLILITER(S): 9 INJECTION, SOLUTION INTRAMUSCULAR; INTRAVENOUS; SUBCUTANEOUS at 06:04

## 2024-10-28 RX ADMIN — Medication 600 MILLIGRAM(S): at 00:08

## 2024-10-28 NOTE — DISCHARGE NOTE OB - FINANCIAL ASSISTANCE
[facility] provides services at a reduced cost to those who are determined to be eligible through [facility]’s financial assistance program. Information regarding [facility]’s financial assistance program can be found by going to [email or web address] or by calling [phone].

## 2024-10-28 NOTE — DISCHARGE NOTE OB - CARE PLAN
2 1 Principal Discharge DX:	Vaginal delivery  Assessment and plan of treatment:	Vaginal delivery, meeting all postpartum milestones.  Please follow-up with your OB doctor in 1 week for blood pressure check.  You can resume a regular diet at home and may continue your prenatal vitamins as directed.  Please place nothing in the vagina for 6 weeks (no tampons, sex, douching, tub baths, swimming pools, etc).  If you have severe headaches and/or vision changes, heavy bleeding, or chest pain, please call your provider or go to the nearest Emergency Department.  Please call your OB with any signs of symptoms of infection including fever > 100.4 degrees, severe pain, malodorous vaginal discharge or heavy bleeding requiring more than 1-2 pads/hour.  You can take Motrin 600mg orally every 6 hours and Tylenol 1000mg orally every 6 hours for pain as needed.  Secondary Diagnosis:	Gestational hypertension  Assessment and plan of treatment:	Monitor blood pressure twice daily.  Document blood pressures to review with obstetrician at follow-up appointment.  Call doctor for persistent systolic blood pressure (top number) equal or greater to 150 AND/OR diastolic blood pressure (bottom number) equal or above 100.      Return to hospital for systolic blood pressure (top number) equal to or greater than 160 AND/OR diastolic blood pressure (bottom number) equal to or greater than 110 OR any of the following symptoms: changes in vision, headache not relieved with Tylenol, severe abdominal pain, vomiting, increased vaginal bleeding, chest pain, or shortness of breath.  Secondary Diagnosis:	Cholestasis during pregnancy   Principal Discharge DX:	Vaginal delivery  Assessment and plan of treatment:	Vaginal delivery, meeting all postpartum milestones.  Please follow-up with your OB doctor in 1 week for blood pressure check.  You can resume a regular diet at home and may continue your prenatal vitamins as directed.  Please place nothing in the vagina for 6 weeks (no tampons, sex, douching, tub baths, swimming pools, etc).  If you have severe headaches and/or vision changes, heavy bleeding, or chest pain, please call your provider or go to the nearest Emergency Department.  Please call your OB with any signs of symptoms of infection including fever > 100.4 degrees, severe pain, malodorous vaginal discharge or heavy bleeding requiring more than 1-2 pads/hour.  You can take Motrin 600mg orally every 6 hours and Tylenol 975mg orally every 6 hours for pain as needed.  Secondary Diagnosis:	Gestational hypertension  Assessment and plan of treatment:	Monitor blood pressure twice daily.  Document blood pressures to review with obstetrician at follow-up appointment.  Call doctor for persistent systolic blood pressure (top number) equal or greater to 150 AND/OR diastolic blood pressure (bottom number) equal or above 100.      Return to hospital for systolic blood pressure (top number) equal to or greater than 160 AND/OR diastolic blood pressure (bottom number) equal to or greater than 110 OR any of the following symptoms: changes in vision, headache not relieved with Tylenol, severe abdominal pain, vomiting, increased vaginal bleeding, chest pain, or shortness of breath.  Secondary Diagnosis:	Cholestasis during pregnancy

## 2024-10-28 NOTE — DISCHARGE NOTE OB - PATIENT PORTAL LINK FT
You can access the FollowMyHealth Patient Portal offered by [facility] by registering at the following website: [email or web address]. By joining [employer]’s FollowMyHealth portal, you will also be able to view your health information using other applications (apps) compatible with our system.

## 2024-10-28 NOTE — PROGRESS NOTE ADULT - SUBJECTIVE AND OBJECTIVE BOX
Patient evaluated at bedside this morning, resting comfortable in bed, no acute events overnight. Vital signs stable overnight.  She reports pain is well controlled with tylenol and motrin.  She denies chest pain, shortness of breath, nausea, vomiting, heavy vaginal bleeding.  She has been ambulating without assistance, voiding spontaneously.  Tolerating food well, without nausea/vomit.      Physical Exam:  T(C): 36.9 (10-28-24 @ 09:06), Max: 36.9 (10-28-24 @ 09:06)  HR: 73 (10-28-24 @ 09:06) (61 - 73)  BP: 118/78 (10-28-24 @ 09:06) (108/62 - 129/76)  RR: 18 (10-28-24 @ 09:06) (18 - 18)  SpO2: 98% (10-28-24 @ 09:06) (96% - 98%)    GA: NAD, A&O x 3  Pulm: no increased work of breathing  Abd: soft, nontender, nondistended, no rebound or guarding, uterus firm.  Extremities: no swelling or calf tenderness            acetaminophen     Tablet .. 975 milliGRAM(s) Oral <User Schedule>  benzocaine 20%/menthol 0.5% Spray 1 Spray(s) Topical every 6 hours PRN  chlorhexidine 2% Cloths 1 Application(s) Topical daily  dibucaine 1% Ointment 1 Application(s) Topical every 6 hours PRN  diphenhydrAMINE 25 milliGRAM(s) Oral every 6 hours PRN  diphtheria/tetanus/pertussis (acellular) Vaccine (Adacel) 0.5 milliLiter(s) IntraMuscular once  hydrocortisone 1% Cream 1 Application(s) Topical every 6 hours PRN  ibuprofen  Tablet. 600 milliGRAM(s) Oral every 6 hours  ketorolac   Injectable 30 milliGRAM(s) IV Push once  lanolin Ointment 1 Application(s) Topical every 6 hours PRN  magnesium hydroxide Suspension 30 milliLiter(s) Oral two times a day PRN  oxyCODONE    IR 5 milliGRAM(s) Oral every 3 hours PRN  oxyCODONE    IR 5 milliGRAM(s) Oral once PRN  oxytocin Infusion 167 milliUNIT(s)/Min IV Continuous <Continuous>  pramoxine 1%/zinc 5% Cream 1 Application(s) Topical every 4 hours PRN  prenatal multivitamin 1 Tablet(s) Oral daily  simethicone 80 milliGRAM(s) Chew every 4 hours PRN  sodium chloride 0.9% lock flush 3 milliLiter(s) IV Push every 8 hours  witch hazel Pads 1 Application(s) Topical every 4 hours PRN     T(C): 36.9 (10-28-24 @ 09:06), Max: 36.9 (10-28-24 @ 09:06)  HR: 73 (10-28-24 @ 09:06) (61 - 73)  BP: 118/78 (10-28-24 @ 09:06) (108/62 - 129/76)  RR: 18 (10-28-24 @ 09:06) (18 - 18)  SpO2: 98% (10-28-24 @ 09:06) (96% - 98%)     ==  A/P   Pt s/p , PPD#2 , stable, meeting postpartum milestones   #gHTN  - VSS  - VSQ4  #PPC  - Pain: well controlled on tylenol/motrin  - GI: Tolerating regular diet  - : urinating without difficulty/pain  - DVT prophylaxis: ambulating frequently  - Dispo: PPD 2, unless otherwise specified

## 2024-10-28 NOTE — DISCHARGE NOTE OB - HOSPITAL COURSE
Admitted for induction of labor due ICP.  Uncomplicated .  Gestational hypertension with negative PEC labs.  Normotensive postpartum, no need for antihypertensives.  Stable for discharge.

## 2024-10-28 NOTE — DISCHARGE NOTE OB - NS MD DC FALL RISK RISK
For information on Fall & Injury Prevention, visit: [email or web address] OR  [email or web address] OR  https://www.cdc.gov/steadi/patient.html

## 2024-10-28 NOTE — DISCHARGE NOTE OB - CARE PROVIDER_API CALL
Mario Alcala  Obstetrics and Gynecology  [address], [facility] [place] [address]  Phone: [phone]  Fax: [phone]  Follow Up Time: 1 week

## 2024-10-28 NOTE — DISCHARGE NOTE OB - PLAN OF CARE
Monitor blood pressure twice daily.  Document blood pressures to review with obstetrician at follow-up appointment.  Call doctor for persistent systolic blood pressure (top number) equal or greater to 150 AND/OR diastolic blood pressure (bottom number) equal or above 100.      Return to hospital for systolic blood pressure (top number) equal to or greater than 160 AND/OR diastolic blood pressure (bottom number) equal to or greater than 110 OR any of the following symptoms: changes in vision, headache not relieved with Tylenol, severe abdominal pain, vomiting, increased vaginal bleeding, chest pain, or shortness of breath. Vaginal delivery, meeting all postpartum milestones.  Please follow-up with your OB doctor in 1 week for blood pressure check.  You can resume a regular diet at home and may continue your prenatal vitamins as directed.  Please place nothing in the vagina for 6 weeks (no tampons, sex, douching, tub baths, swimming pools, etc).  If you have severe headaches and/or vision changes, heavy bleeding, or chest pain, please call your provider or go to the nearest Emergency Department.  Please call your OB with any signs of symptoms of infection including fever > 100.4 degrees, severe pain, malodorous vaginal discharge or heavy bleeding requiring more than 1-2 pads/hour.  You can take Motrin 600mg orally every 6 hours and Tylenol 1000mg orally every 6 hours for pain as needed. Vaginal delivery, meeting all postpartum milestones.  Please follow-up with your OB doctor in 1 week for blood pressure check.  You can resume a regular diet at home and may continue your prenatal vitamins as directed.  Please place nothing in the vagina for 6 weeks (no tampons, sex, douching, tub baths, swimming pools, etc).  If you have severe headaches and/or vision changes, heavy bleeding, or chest pain, please call your provider or go to the nearest Emergency Department.  Please call your OB with any signs of symptoms of infection including fever > 100.4 degrees, severe pain, malodorous vaginal discharge or heavy bleeding requiring more than 1-2 pads/hour.  You can take Motrin 600mg orally every 6 hours and Tylenol 975mg orally every 6 hours for pain as needed.

## 2024-10-29 ENCOUNTER — NON-APPOINTMENT (OUTPATIENT)
Age: 34
End: 2024-10-29

## 2024-10-30 ENCOUNTER — NON-APPOINTMENT (OUTPATIENT)
Age: 34
End: 2024-10-30

## 2024-10-30 DIAGNOSIS — O13.3 GESTATIONAL [PREGNANCY-INDUCED] HYPERTENSION W/OUT SIGNIFICANT PROTEINURIA, THIRD TRIMESTER: ICD-10-CM

## 2024-10-31 PROBLEM — O13.3 GESTATIONAL HYPERTENSION WITHOUT SIGNIFICANT PROTEINURIA DURING PREGNANCY IN THIRD TRIMESTER, ANTEPARTUM: Status: ACTIVE | Noted: 2024-10-31

## 2024-10-31 RX ORDER — IBUPROFEN 600 MG/1
600 TABLET ORAL 4 TIMES DAILY
Refills: 0 | Status: ACTIVE | COMMUNITY
Start: 2024-10-31

## 2024-10-31 RX ORDER — ACETAMINOPHEN 500 MG/1
500 TABLET ORAL 3 TIMES DAILY
Refills: 0 | Status: ACTIVE | COMMUNITY
Start: 2024-10-31

## 2024-11-01 ENCOUNTER — NON-APPOINTMENT (OUTPATIENT)
Age: 34
End: 2024-11-01

## 2024-11-03 DIAGNOSIS — Z28.09 IMMUNIZATION NOT CARRIED OUT BECAUSE OF OTHER CONTRAINDICATION: ICD-10-CM

## 2024-11-03 DIAGNOSIS — Z3A.38 38 WEEKS GESTATION OF PREGNANCY: ICD-10-CM

## 2024-11-03 DIAGNOSIS — O26.643 INTRAHEPATIC CHOLESTASIS OF PREGNANCY, THIRD TRIMESTER: ICD-10-CM

## 2024-11-05 LAB — SURGICAL PATHOLOGY STUDY: SIGNIFICANT CHANGE UP

## 2024-11-06 ENCOUNTER — NON-APPOINTMENT (OUTPATIENT)
Age: 34
End: 2024-11-06

## 2025-02-06 ENCOUNTER — APPOINTMENT (OUTPATIENT)
Dept: OBGYN | Facility: CLINIC | Age: 35
End: 2025-02-06
Payer: COMMERCIAL

## 2025-02-06 ENCOUNTER — NON-APPOINTMENT (OUTPATIENT)
Age: 35
End: 2025-02-06

## 2025-02-06 VITALS
OXYGEN SATURATION: 98 % | SYSTOLIC BLOOD PRESSURE: 114 MMHG | DIASTOLIC BLOOD PRESSURE: 75 MMHG | BODY MASS INDEX: 21.97 KG/M2 | WEIGHT: 140 LBS | HEART RATE: 76 BPM | HEIGHT: 67 IN

## 2025-02-06 DIAGNOSIS — Z01.419 ENCOUNTER FOR GYNECOLOGICAL EXAMINATION (GENERAL) (ROUTINE) W/OUT ABNORMAL FINDINGS: ICD-10-CM

## 2025-02-06 DIAGNOSIS — Z34.90 ENCOUNTER FOR SUPERVISION OF NORMAL PREGNANCY, UNSPECIFIED, UNSPECIFIED TRIMESTER: ICD-10-CM

## 2025-02-06 DIAGNOSIS — O13.3 GESTATIONAL [PREGNANCY-INDUCED] HYPERTENSION W/OUT SIGNIFICANT PROTEINURIA, THIRD TRIMESTER: ICD-10-CM

## 2025-02-06 PROCEDURE — 99395 PREV VISIT EST AGE 18-39: CPT

## 2025-02-10 LAB — CYTOLOGY CVX/VAG DOC THIN PREP: NORMAL
